# Patient Record
Sex: FEMALE | Race: WHITE | NOT HISPANIC OR LATINO | Employment: STUDENT | URBAN - METROPOLITAN AREA
[De-identification: names, ages, dates, MRNs, and addresses within clinical notes are randomized per-mention and may not be internally consistent; named-entity substitution may affect disease eponyms.]

---

## 2020-02-12 ENCOUNTER — APPOINTMENT (OUTPATIENT)
Dept: GENERAL RADIOLOGY | Facility: HOSPITAL | Age: 20
End: 2020-02-12

## 2020-02-12 ENCOUNTER — HOSPITAL ENCOUNTER (EMERGENCY)
Facility: HOSPITAL | Age: 20
Discharge: HOME | End: 2020-02-12
Attending: EMERGENCY MEDICINE | Admitting: EMERGENCY MEDICINE

## 2020-02-12 VITALS
WEIGHT: 176 LBS | DIASTOLIC BLOOD PRESSURE: 78 MMHG | HEART RATE: 105 BPM | HEIGHT: 64 IN | OXYGEN SATURATION: 97 % | BODY MASS INDEX: 30.05 KG/M2 | RESPIRATION RATE: 20 BRPM | SYSTOLIC BLOOD PRESSURE: 126 MMHG | TEMPERATURE: 98.5 F

## 2020-02-12 DIAGNOSIS — J45.21 MILD INTERMITTENT ASTHMA WITH ACUTE EXACERBATION: Primary | ICD-10-CM

## 2020-02-12 LAB
HOLD SPECIMEN: NORMAL
HOLD SPECIMEN: NORMAL
WHOLE BLOOD HOLD SPECIMEN: NORMAL
WHOLE BLOOD HOLD SPECIMEN: NORMAL

## 2020-02-12 PROCEDURE — 94799 UNLISTED PULMONARY SVC/PX: CPT

## 2020-02-12 PROCEDURE — 99284 EMERGENCY DEPT VISIT MOD MDM: CPT

## 2020-02-12 PROCEDURE — 94640 AIRWAY INHALATION TREATMENT: CPT

## 2020-02-12 PROCEDURE — 63710000001 PREDNISONE PER 1 MG: Performed by: EMERGENCY MEDICINE

## 2020-02-12 PROCEDURE — 71046 X-RAY EXAM CHEST 2 VIEWS: CPT

## 2020-02-12 RX ORDER — PREDNISONE 20 MG/1
40 TABLET ORAL DAILY
Qty: 10 TABLET | Refills: 0 | Status: SHIPPED | OUTPATIENT
Start: 2020-02-12 | End: 2020-02-17

## 2020-02-12 RX ORDER — PREDNISONE 20 MG/1
60 TABLET ORAL ONCE
Status: COMPLETED | OUTPATIENT
Start: 2020-02-12 | End: 2020-02-12

## 2020-02-12 RX ORDER — ALBUTEROL SULFATE 2.5 MG/3ML
2.5 SOLUTION RESPIRATORY (INHALATION)
Status: ACTIVE | OUTPATIENT
Start: 2020-02-12 | End: 2020-02-12

## 2020-02-12 RX ORDER — IPRATROPIUM BROMIDE AND ALBUTEROL SULFATE 2.5; .5 MG/3ML; MG/3ML
3 SOLUTION RESPIRATORY (INHALATION) ONCE
Status: COMPLETED | OUTPATIENT
Start: 2020-02-12 | End: 2020-02-12

## 2020-02-12 RX ADMIN — ALBUTEROL SULFATE 2.5 MG: 2.5 SOLUTION RESPIRATORY (INHALATION) at 20:44

## 2020-02-12 RX ADMIN — ALBUTEROL SULFATE 2.5 MG: 2.5 SOLUTION RESPIRATORY (INHALATION) at 20:56

## 2020-02-12 RX ADMIN — PREDNISONE 60 MG: 20 TABLET ORAL at 20:39

## 2020-02-12 RX ADMIN — IPRATROPIUM BROMIDE AND ALBUTEROL SULFATE 3 ML: 2.5; .5 SOLUTION RESPIRATORY (INHALATION) at 20:44

## 2020-02-12 NOTE — ED TRIAGE NOTES
EMS reports that patient has been depressed from a recent breakup from her boyfriend, she has been smoking THC, and has cut marks to forearms.

## 2020-02-13 NOTE — ED PROVIDER NOTES
EMERGENCY DEPARTMENT ENCOUNTER    Room Number:  18/18  Date of encounter:  2/12/2020  PCP: System, Provider Not In  Historian: patient, ***      HPI:  Chief Complaint: ***  A complete HPI/ROS/PMH/PSH/SH/FH are unobtainable due to: nothing    Context: Ladonna Rodriguez is a 19 y.o. female who presents to the ED c/o *** that started ***.     Pt also c/o ***.     Pt denies *** and all other complaints at this time.       PAST MEDICAL HISTORY  Active Ambulatory Problems     Diagnosis Date Noted   • No Active Ambulatory Problems     Resolved Ambulatory Problems     Diagnosis Date Noted   • No Resolved Ambulatory Problems     No Additional Past Medical History         PAST SURGICAL HISTORY  No past surgical history on file.      FAMILY HISTORY  No family history on file.      SOCIAL HISTORY  Social History     Socioeconomic History   • Marital status: Single     Spouse name: Not on file   • Number of children: Not on file   • Years of education: Not on file   • Highest education level: Not on file         ALLERGIES  Latex        REVIEW OF SYSTEMS  Review of Systems   Constitutional: Negative for fever.   HENT: Negative for sore throat.    Eyes: Negative.    Respiratory: Negative for cough and shortness of breath.    Cardiovascular: Negative for chest pain.   Gastrointestinal: Negative for abdominal pain, diarrhea and vomiting.   Genitourinary: Negative for dysuria.   Musculoskeletal: Negative for neck pain.   Skin: Negative for rash.   Allergic/Immunologic: Negative.    Neurological: Negative for weakness, numbness and headaches.   Hematological: Negative.    Psychiatric/Behavioral: Negative.    All other systems reviewed and are negative.       All other ROS negative except as documented in HPI      PHYSICAL EXAM    I have reviewed the triage vital signs and nursing notes.    ED Triage Vitals [02/12/20 1549]   Temp Heart Rate Resp BP SpO2   98.9 °F (37.2 °C) 116 24 116/60 95 %      Temp src Heart Rate Source Patient Position  BP Location FiO2 (%)   Tympanic -- -- -- --       GENERAL: awake, alert, not distressed  HENT: nares patent, normocephalic, atraumatic  EYES: no scleral icterus, PERRL, EOMI  CV: regular rhythm, regular rate, no murmur  RESPIRATORY: normal effort, CTAB  ABDOMEN: soft, non-tender, non-distended  MUSCULOSKELETAL: no deformity, no edema  NEURO: alert, ***, moves all extremities, follows commands  SKIN: warm, dry        LAB RESULTS  Recent Results (from the past 24 hour(s))   Light Blue Top    Collection Time: 02/12/20  4:25 PM   Result Value Ref Range    Extra Tube hold for add-on    Green Top (Gel)    Collection Time: 02/12/20  4:25 PM   Result Value Ref Range    Extra Tube Hold for add-ons.    Lavender Top    Collection Time: 02/12/20  4:25 PM   Result Value Ref Range    Extra Tube hold for add-on    Gold Top - SST    Collection Time: 02/12/20  4:25 PM   Result Value Ref Range    Extra Tube Hold for add-ons.        Ordered the above labs and independently reviewed the results.        RADIOLOGY  Xr Chest 2 View    Result Date: 2/12/2020  XR CHEST 2 VW-  HISTORY: Female who is 19 years-old,  short of breath  TECHNIQUE: Frontal and lateral views of the chest  COMPARISON: None available  FINDINGS: Heart, mediastinum and pulmonary vasculature are unremarkable. No focal pulmonary consolidation, pleural effusion, or pneumothorax. No acute osseous process.      No evidence for acute pulmonary process. Follow-up as clinical indications persist.  This report was finalized on 2/12/2020 5:11 PM by Dr. Johan Dove M.D.        I ordered the above noted radiological studies. Independently reviewed by me and discussed with radiologist.  See dictation for official radiology interpretation.      PROCEDURES    Procedures      MEDICATIONS GIVEN IN ER    Medications - No data to display      PROGRESS, DATA ANALYSIS, CONSULTS, AND MEDICAL DECISION MAKING    All labs have been independently reviewed by me.  All radiology studies  have been reviewed by me and discussed with radiologist dictating report.   EKG's independently reviewed by me.  Discussion below represents my analysis of pertinent findings related to patient's condition, differential diagnosis, treatment plan and final disposition.         --  ***.     ***. Spoke with  *** about the patient's case. After a bedside evaluation, he agrees with the plan of care.    --  AS OF 7:48 PM VITALS:    BP - 106/78  HR - 112  TEMP - 98.5 °F (36.9 °C) (Tympanic)  02 SATS - 94%        DIAGNOSIS  Final diagnoses:   None         DISPOSITION  ***    --  Documentation assistance provided by oscar Luna for Kin Llanos PA-C.  Information recorded by the scribe was done at my direction and has been verified and validated by me.     Melissa Luna  02/12/20 1948

## 2020-02-13 NOTE — ED NOTES
Pt placed on 2 l/min of O2 per nasal cannula per pt request. Pt O2 sat 96% on room air.      Ivonne Landin RN  02/12/20 2035

## 2020-02-13 NOTE — ED PROVIDER NOTES
EMERGENCY DEPARTMENT ENCOUNTER    Room Number:  06/06  Date of encounter:  2/12/2020  PCP: System, Provider Not In  Historian: The patient and patient's mother    HPI:  Chief Complaint: Asthma exacerbation  Context: Ladonna Rodriguez is a 19 y.o. female who presents to the ED c/o asthma exacerbation that began approximately 2 to 3 hours ago.  Patient was in a tattoo shop when she began to feel short of breath and was wheezing.  Patient used her metered-dose inhaler with minimal improvement.  Patient was subsequently transported by EMS who gave her a breathing treatment in route with some improvement of her symptoms.  Patient states her wheezing has improved but still has some dyspnea.  Patient complains of recent runny nose and nonproductive cough.  No fevers, no vomiting, no diarrhea.  Patient was diagnosed with asthma 1 year prior.  This is her first visit to the emergency department related to asthma.  No previous hospitalizations or ICU stays or intubations.  Patient has had no recent steroid use.  Patient is not on an inhaled steroid.  Denies any chest pain.  Patient does state that she has been feeling depressed about the recent break-up with her boyfriend.  Patient has a history of depression is on Zoloft, followed by psychiatrist.  Patient has been taking her medications regularly, no missed dosages.  Patient denies any SI, HI, audiovisual hallucinations.  Patient denies any recent alcohol or other drugs other than marijuana.  Patient states last marijuana use was 1 to 2 days prior.      MEDICAL HISTORY REVIEW  Reviewed in EPIC       PAST MEDICAL HISTORY  Active Ambulatory Problems     Diagnosis Date Noted   • No Active Ambulatory Problems     Resolved Ambulatory Problems     Diagnosis Date Noted   • No Resolved Ambulatory Problems     No Additional Past Medical History         PAST SURGICAL HISTORY  No past surgical history on file.      FAMILY HISTORY  No family history on file.      SOCIAL HISTORY  Social  History     Socioeconomic History   • Marital status: Single     Spouse name: Not on file   • Number of children: Not on file   • Years of education: Not on file   • Highest education level: Not on file         ALLERGIES  Latex    The patient's allergies have been reviewed    REVIEW OF SYSTEMS  All systems reviewed and negative except for those discussed in HPI.       PHYSICAL EXAM  I have reviewed the triage vital signs and nursing notes.    ED Triage Vitals [02/12/20 1549]   Temp Heart Rate Resp BP SpO2   98.9 °F (37.2 °C) 116 24 116/60 95 %      Temp src Heart Rate Source Patient Position BP Location FiO2 (%)   Tympanic -- -- -- --       Physical Exam   Constitutional: She is oriented to person, place, and time. She appears well-developed and well-nourished.   HENT:   Head: Normocephalic and atraumatic.   Eyes: Pupils are equal, round, and reactive to light. EOM are normal.   Neck: Normal range of motion. Neck supple.   Cardiovascular: Normal rate, regular rhythm, normal heart sounds and intact distal pulses.   Pulmonary/Chest: Effort normal.   End expiratory wheeze   Abdominal: Soft. There is no tenderness. There is no rebound and no guarding.   Musculoskeletal: Normal range of motion. She exhibits no edema or deformity.   Neurological: She is alert and oriented to person, place, and time.   Skin: Skin is warm and dry.   Nursing note and vitals reviewed.          LAB RESULTS  Recent Results (from the past 24 hour(s))   Light Blue Top    Collection Time: 02/12/20  4:25 PM   Result Value Ref Range    Extra Tube hold for add-on    Green Top (Gel)    Collection Time: 02/12/20  4:25 PM   Result Value Ref Range    Extra Tube Hold for add-ons.    Lavender Top    Collection Time: 02/12/20  4:25 PM   Result Value Ref Range    Extra Tube hold for add-on    Gold Top - SST    Collection Time: 02/12/20  4:25 PM   Result Value Ref Range    Extra Tube Hold for add-ons.        I ordered the above labs and reviewed the  results.      RADIOLOGY  Xr Chest 2 View    Result Date: 2/12/2020  XR CHEST 2 VW-  HISTORY: Female who is 19 years-old,  short of breath  TECHNIQUE: Frontal and lateral views of the chest  COMPARISON: None available  FINDINGS: Heart, mediastinum and pulmonary vasculature are unremarkable. No focal pulmonary consolidation, pleural effusion, or pneumothorax. No acute osseous process.      No evidence for acute pulmonary process. Follow-up as clinical indications persist.  This report was finalized on 2/12/2020 5:11 PM by Dr. Johan Dove M.D.        I ordered the above noted radiological studies. I reviewed the images and results. I agree with the radiologist interpretation.      PROCEDURES  Procedures      MEDICATIONS GIVEN IN ER  Medications   albuterol (PROVENTIL) nebulizer solution 0.083% 2.5 mg/3mL (2.5 mg Nebulization Not Given 2/12/20 2106)   predniSONE (DELTASONE) tablet 60 mg (60 mg Oral Given 2/12/20 2039)   ipratropium-albuterol (DUO-NEB) nebulizer solution 3 mL (3 mL Nebulization Given 2/12/20 2044)         PROGRESS, DATA ANALYSIS, CONSULTS, AND MEDICAL DECISION MAKING  A complete history and physical exam have been performed.  All available laboratory and imaging results have been reviewed by myself prior to disposition.  [facility]      ED Course as of Feb 12 2138   Wed Feb 12, 2020   2015 Discussed pertinent information from history and physical exam with patient.  Discussed differential diagnosis.  Discussed plan for ED evaluation/work-up/treatment.  All questions answered.  Patient is agreeable with plan.        [JG]   2120 Patient reassessed status post breathing treatment.  Respiratory symptoms resolved.  Repeat lung exam: Good air movement throughout, clear to auscultation bilaterally, no wheezing.  Plan for discharge with primary care follow-up.    [JG]   2137 The patient was reexamined.  They have had symptomatic improvement during their ED stay.  I discussed today's findings with the patient,  explaining the pertinent positives and negatives from today's visit, and the plan of care.  Discussed plan for discharge as there is no emergent indication for admission.  Discussed limitation of the ED work-up and that this is to rule out life-threatening emergencies but that they could require further testing as determined by their primary care and or any referred specialist patient is agreeable and understands need for follow-up and repeat exam/testing.  Patient is aware that discharge does not mean there is nothing wrong, indicates no emergency is present, and that they must continue their care with their primary care physician and/or any referred specialist.  They were given appropriate follow-up with their primary care physician and/or specialist.  I had an extensive discussion on the expected clinical course and return precautions.  Patient understands to return to the emergency department for continuation, worsening, or new symptoms.  I answered any of the patient's questions. Patient was discharged home in a stable condition.        [JG]      ED Course User Index  [JG] Ji Guajardo MD       AS OF 9:38 PM VITALS:    BP - 126/78  HR - 105  TEMP - 98.5 °F (36.9 °C) (Tympanic)  O2 SATS - 97%        DIAGNOSIS  Final diagnoses:   Mild intermittent asthma with acute exacerbation         DISPOSITION  DISCHARGE    Patient discharged in stable condition.    Reviewed implications of results, diagnosis, meds, responsibility to follow up, warning signs and symptoms of possible worsening, potential complications and reasons to return to ER.    Patient/Family voiced understanding of above instructions.    Discussed plan for discharge, as there is no emergent indication for admission. Patient referred to primary care provider for BP management due to today's BP. Pt/family is agreeable and understands need for follow up and repeat testing.  Pt is aware that discharge does not mean that nothing is wrong but it  indicates no emergency is present that requires admission and they must continue care with follow-up as given below or physician of their choice.     FOLLOW-UP  Your PCP    Schedule an appointment as soon as possible for a visit in 2 days  even if well    [facility] [place] [id number]  [phone]    if you are unable to follow up with your PCP         Medication List      New Prescriptions    predniSONE 20 MG tablet  Commonly known as:  DELTASONE  Take 2 tablets by mouth Daily for 5 days.               Ji Guajardo MD  02/12/20 8311